# Patient Record
Sex: FEMALE | Race: WHITE
[De-identification: names, ages, dates, MRNs, and addresses within clinical notes are randomized per-mention and may not be internally consistent; named-entity substitution may affect disease eponyms.]

---

## 2017-03-17 ENCOUNTER — HOSPITAL ENCOUNTER (OUTPATIENT)
Dept: HOSPITAL 33 - ED | Age: 47
Setting detail: OBSERVATION
LOS: 1 days | Discharge: HOME | End: 2017-03-18
Attending: FAMILY MEDICINE | Admitting: FAMILY MEDICINE
Payer: MEDICARE

## 2017-03-17 DIAGNOSIS — E87.6: Primary | ICD-10-CM

## 2017-03-17 DIAGNOSIS — Z72.0: ICD-10-CM

## 2017-03-17 DIAGNOSIS — I15.2: ICD-10-CM

## 2017-03-17 DIAGNOSIS — F41.8: ICD-10-CM

## 2017-03-17 DIAGNOSIS — E26.89: ICD-10-CM

## 2017-03-17 LAB
ANION GAP SERPL CALC-SCNC: 14.8 MEQ/L (ref 5–15)
BASOPHILS NFR BLD AUTO: 0.3 % (ref 0–0.4)
BUN SERPL-MCNC: 10 MG/DL (ref 9–20)
CHLORIDE SERPL-SCNC: 112 MEQ/L (ref 98–107)
CO2 SERPL-SCNC: 22.4 MEQ/L (ref 21–32)
GLUCOSE SERPL-MCNC: 134 MG/DL (ref 70–110)
MCH RBC QN AUTO: 30 PG (ref 26–32)
NEUTROPHILS NFR BLD AUTO: 49.7 % (ref 36–66)
PLATELET # BLD AUTO: 298 K/MM3 (ref 150–450)
POTASSIUM SERPLBLD-SCNC: 2.1 MEQ/L (ref 3.5–5.1)
RBC # BLD AUTO: 4.9 M/MM3 (ref 4.1–5.4)
SODIUM SERPL-SCNC: 147 MEQ/L (ref 136–145)
WBC # BLD AUTO: 11.8 K/MM3 (ref 4–10.5)

## 2017-03-17 PROCEDURE — 83735 ASSAY OF MAGNESIUM: CPT

## 2017-03-17 PROCEDURE — 36000 PLACE NEEDLE IN VEIN: CPT

## 2017-03-17 PROCEDURE — 96367 TX/PROPH/DG ADDL SEQ IV INF: CPT

## 2017-03-17 PROCEDURE — 93268 ECG RECORD/REVIEW: CPT

## 2017-03-17 PROCEDURE — 36415 COLL VENOUS BLD VENIPUNCTURE: CPT

## 2017-03-17 PROCEDURE — 80048 BASIC METABOLIC PNL TOTAL CA: CPT

## 2017-03-17 PROCEDURE — 96360 HYDRATION IV INFUSION INIT: CPT

## 2017-03-17 PROCEDURE — 93005 ELECTROCARDIOGRAM TRACING: CPT

## 2017-03-17 PROCEDURE — 84132 ASSAY OF SERUM POTASSIUM: CPT

## 2017-03-17 PROCEDURE — 96365 THER/PROPH/DIAG IV INF INIT: CPT

## 2017-03-17 PROCEDURE — 85025 COMPLETE CBC W/AUTO DIFF WBC: CPT

## 2017-03-17 PROCEDURE — 99285 EMERGENCY DEPT VISIT HI MDM: CPT

## 2017-03-17 RX ADMIN — METOPROLOL SUCCINATE SCH MG: 25 TABLET, EXTENDED RELEASE ORAL at 21:54

## 2017-03-17 RX ADMIN — POTASSIUM CHLORIDE SCH MLS/HR: 14.9 INJECTION, SOLUTION INTRAVENOUS at 22:22

## 2017-03-17 RX ADMIN — POTASSIUM CHLORIDE SCH MLS/HR: 14.9 INJECTION, SOLUTION INTRAVENOUS at 17:38

## 2017-03-17 RX ADMIN — PREGABALIN SCH MG: 25 CAPSULE ORAL at 21:53

## 2017-03-17 RX ADMIN — AMILORIDE HYDROCHLORIDE SCH MG: 5 TABLET ORAL at 22:52

## 2017-03-17 NOTE — ERPHSYRPT
- History of Present Illness


Time Seen by Provider: 03/17/17 12:50


Source: patient


Exam Limitations: clinical condition


Patient Subjective Stated Complaint: "they [facility] told [facility] to come to [facility] because 

my potassium is 2.6"


Triage Nursing Assessment: aox3, breathign easy unlabored, skin pink warm dry, 

steady gait


Physician History: 





PATIENT WITH A HISTORY OF HYPERTENSION, REFERRED TO EMERGENCY FOR EVALUATION OF 

LOW POTASSIUM FROM CLINIC. PATIENT COMPLAINS OF LEFT NUMBNESS ON OCCASION, 

DENIES EMESIS OR DIARRHEA.


Timing/Duration: yesterday


Modifying Factors: Improves With: other (DENIES COMPLAINS OTHER THAN NUMBNESS)


Associated Symptoms: weakness


Allergies/Adverse Reactions: 








venom-honey bee [bee venom (honey bee)] Allergy (Severe, Verified 07/27/16 13:01

)


 Difficulty Breathing


Iodinated Contrast Media - Oral and [Iodinated Contrast Media - IV Dye] Allergy 

(Intermediate, Verified 07/27/16 10:36)


 Itching


cocoa butter Allergy (Verified 07/27/16 13:01)


 Hives


codeine [Codeine] Allergy (Verified 09/30/12 23:14)


 


iodine Allergy (Verified 09/30/12 23:14)


 


sumatriptan [From Imitrex] Allergy (Verified 09/30/12 23:14)


 


sumatriptan succinate [From Imitrex] Allergy (Verified 09/30/12 23:14)


 


hydrochlorothiazide Adverse Reaction (Verified 07/27/16 13:01)


 


tramadol Adverse Reaction (Verified 07/27/16 13:01)


 Vomiting





Home Medications: 








Cyclobenzaprine HCl 10 mg*** [Cyclobenzaprine 10 MG***] 10 mg PO HSPRN PRN 07/27 /16 [History]


Loratadine 10 mg*** [Claritin 10 mg***] 10 mg PO DAILY 07/27/16 [History]


Metoprolol Succinate 25 mg Xl* [Toprol-Xl 25MG Tablets***] 25 mg PO BID 07/27/ 16 [History]


Naproxen [Naprosyn] 500 mg PO BID PRN 07/27/16 [History]


Oxybutynin Chloride [Oxybutynin Chloride ER] 10 mg PO DAILY 07/27/16 [History]


Simvastatin 0.5 tab PO HS 07/27/16 [History]


Topiramate 100 mg*** [Topamax 100 MG***] 50 mg PO BID 07/27/16 [History]


Famotidine [Pepcid] 40 mg PO DAILY 03/17/17 [History]


Potassium Chloride [Potassium Chloride 20MEQ/15ML] 40 meq PO DAILY 03/17/17 [

History]


Pregabalin 25 mg*** [Lyrica 25 MG***] 25 mg PO BID 03/17/17 [History]





Hx Tetanus, Diphtheria Vaccination/Date Given: Yes


Hx Influenza Vaccination/Date Given: No


Hx Pneumococcal Vaccination/Date Given: No





- Review of Systems


Constitutional: Weakness, No Fever, No Chills


Eyes: No Symptoms


Ears, Nose, & Throat: No Symptoms


Respiratory: No Symptoms, No Cough, No Dyspnea


Cardiac: No Symptoms, No Chest Pain, No Edema, No Syncope


Abdominal/Gastrointestinal: No Symptoms, No Abdominal Pain, No Nausea, No 

Vomiting, No Diarrhea


Genitourinary Symptoms: No Symptoms, No Dysuria


Musculoskeletal: No Symptoms, No Back Pain, No Neck Pain


Skin: No Symptoms, No Rash


Neurological: Parasthesia (IN LEGS), No Dizziness, No Focal Weakness, No 

Sensory Changes


Psychological: No Symptoms


Endocrine: No Symptoms


Hematologic/Lymphatic: No Symptoms


All Other Systems: Reviewed and Negative





- Past Medical History


Pertinent Past Medical History: Yes


Neurological History: Migraines


ENT History: No Pertinent History


Cardiac History: Hypertension


Respiratory History: No Pertinent History


Endocrine Medical History: No Pertinent History


Musculoskeletal History: No Pertinent History


GI Medical History: No Pertinent History


 History: No Pertinent History


Psycho-Social History: Anxiety, Depression


Female Reproductive Disorders: No Pertinent History





- Past Surgical History


Past Surgical History: Yes


Neuro Surgical History: No Pertinent History


Cardiac: No Pertinent History


Respiratory: No Pertinent History


Gastrointestinal: Appendectomy


Genitourinary: No Pertinent History


Musculoskeletal: No Pertinent History


Female Surgical History: Hysterectomy





- Social History


Smoking Status: Current every day smoker


How long have you smoked: 30 YEARS


Exposure to second hand smoke: Yes


Drug Use: none


Patient Lives Alone: No





- Female History


Hx Pregnant Now: No





- Nursing Vital Signs


Nursing Vital Signs: 


 Initial Vital Signs











Temperature                    98.0 F


 


Temperature Source             Oral


 


Pulse Rate                     73


 


Respiratory Rate               22


 


Blood Pressure [Right Arm]     156/102


 


Pain Intensity                 4

















- Physical Exam


General Appearance: no apparent distress, alert


Eye Exam: PERRL/EOMI, eyes nml inspection


Ears, Nose, Throat Exam: normal ENT inspection, TMs normal, pharynx normal, 

moist mucous membranes


Neck Exam: normal inspection, non-tender, supple, full range of motion


Respiratory Exam: normal breath sounds, lungs clear, No respiratory distress


Cardiovascular Exam: regular rate/rhythm, normal heart sounds, normal 

peripheral pulses


Gastrointestinal/Abdomen Exam: soft, normal bowel sounds, No tenderness, No mass


Back Exam: normal inspection, normal range of motion, No CVA tenderness, No 

vertebral tenderness


Extremity Exam: normal inspection, normal range of motion, pelvis stable


Neurologic Exam: alert, oriented x 3, cooperative, normal mood/affect, nml 

cerebellar function, nml station & gait, sensation nml, No motor deficits


Skin Exam: normal color, warm, dry, No rash


Lymphatic Exam: No adenopathy


**SpO2 Interpretation**: normal


SpO2: 97


Oxygen Delivery: Room Air





- Course


EKG Interpreted by Me: RATE, Sinus Rhythm, NORMAL AXIS, Other (RATE 73 ANTERIOR 

LATERAL FLAT T-WAVES)


Ordered Tests: 


 Active Orders 24 hr











 Category Date Time Status


 


 EKG-ER Only STAT Care  03/17/17 14:07 Active


 


 BMP Stat Lab  03/17/17 13:24 Completed


 


 CBC W DIFF Stat Lab  03/17/17 13:24 Completed


 


 MAGNESIUM Stat Lab  03/17/17 13:24 Completed


 


 Transfer Order Routine Transfer  03/17/17 14:31 Ordered








Medication Summary











Generic Name Dose Route Start Last Admin





  Trade Name Freq  PRN Reason Stop Dose Admin


 


Sodium Chloride  1,000 mls @ 50 mls/hr  03/17/17 13:15  03/17/17 13:14





  Sodium Chloride 0.9% 1000 Ml  IV  04/16/17 13:14  50 mls/hr





  .Q20H SHAVON   Administration


 


Potassium Chloride  100 mls @ 50 mls/hr  03/17/17 13:55  03/17/17 14:10





  Potassium Chloride 20 Meq In Water 100ml  IV  03/17/17 15:54  50 mls/hr





  STAT ONE   Administration


 


Potassium Chloride  40 meq  03/18/17 14:17  03/17/17 14:25





  Potassium Chl 40 Meq/30 Ml Oral Solution***  PO  03/18/17 14:18  40 meq





  STAT ONE   Administration














Discontinued Medications














Generic Name Dose Route Start Last Admin





  Trade Name Freq  PRN Reason Stop Dose Admin


 


Sodium Chloride  Confirm  03/17/17 13:14  





  Sodium Chloride 0.9% 1000 Ml  Administered  03/17/17 13:15  





  Dose   





  1,000 mls @ ud   





  .ROUTE   





  .STK-MED ONE   


 


Potassium Chloride  Confirm  03/17/17 14:07  





  Potassium Chloride 20 Meq In Water 100ml  Administered  03/17/17 14:08  





  Dose   





  100 mls @ ud   





  IV   





  .STK-MED ONE   


 


Potassium Chloride  40 meq  03/18/17 10:00  





  Potassium Chl 40 Meq/30 Ml Oral Solution***  PO  04/17/17 09:59  





  DAILY SHAVON   


 


Potassium Chloride  Confirm  03/17/17 14:07  





  Potassium Chl 40 Meq/30 Ml Oral Solution***  Administered  03/17/17 14:08  





  Dose   





  40 meq   





  .ROUTE   





  .STK-MED ONE   











Lab/Rad Data: 


 Laboratory Result Diagrams





 03/17/17 13:24 





 03/17/17 13:24 





 Laboratory Results











  03/17/17 03/17/17 03/17/17 Range/Units





  13:24 13:24 13:24 


 


WBC    11.8 H  (4.0-10.5)  K/mm3


 


RBC    4.90  (4.1-5.4)  M/mm3


 


Hgb    14.7  (12.0-16.0)  gm/dl


 


Hct    43.5  (35-47)  %


 


MCV    88.8  ()  fl


 


MCH    30.0  (26-32)  pg


 


MCHC    33.8  (32-36)  g/dl


 


RDW    14.5 H  (11.5-14.0)  %


 


Plt Count    298  (150-450)  K/mm3


 


MPV    10.3 H  (6-9.5)  fl


 


Gran %    49.7  (36.0-66.0)  %


 


Lymphocytes %    40.5  (24.0-44.0)  %


 


Monocytes %    6.7  (0.0-12.0)  %


 


Eosinophils %    2.8  (0.00-5.0)  %


 


Basophils %    0.3  (0.0-0.4)  %


 


Basophils #    0.03  (0-0.4)  


 


Sodium   147 H   (136-145)  mEq/L


 


Potassium   2.1 L*   (3.5-5.1)  mEq/L


 


Chloride   112 H   ()  mEq/L


 


Carbon Dioxide   22.4   (21-32)  mEq/L


 


Anion Gap   14.8   (5-15)  MEQ/L


 


BUN   10   (9-20)  mg/dL


 


Creatinine   0.86   (0.55-1.30)  mg/dl


 


Estimated GFR   > 60   ML/MIN


 


Glucose   134 H   ()  MG/DL


 


Calcium   8.5   (8.5-10.1)  mg/dL


 


Magnesium  2.0    (1.8-2.4)  mg/dL














- Progress


Discussed with : Alberto


Will see patient in: hospital (full admit) (DISCUSSED WITH DR RAMIREZ AT 1430 FOR 

ADMISSION)





- Departure


Time of Disposition: 14:35


Departure Disposition: In-patient Admission


Clinical Impression: 


 HYPOKALEMIA





Condition: Stable


Critical Care Time: No


Referrals: 


SARA HOWE MD [Primary Care Provider] -

## 2017-03-17 NOTE — PCM.HP
History of Present Illness





- Chief Complaint


Chief Complaint: hypokalemia


History of Present Illness: 


 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 

profound hypokalemia in the past. she was sent to ER due to low potassium from 

[facility], she denies chest pain, palpitations or paresthesias. She has not 

had any vomiting or diarrhea etc.








- Review of Systems


Constitutional: No Symptoms


Eyes: No Symptoms


Respiratory: No Cough, No Short Of Breath


Cardiac: No Chest Pain, No Edema, No Syncope


Abdominal/Gastrointestinal: No Abdominal Pain, No Nausea, No Vomiting, No 

Diarrhea


Neurological: No Dizziness, No Focal Weakness, No Sensory Changes





Medications & Allergies


Home Medications: 


 Home Medication List





Cyclobenzaprine HCl 10 mg*** [Cyclobenzaprine 10 MG***] 10 mg PO HSPRN PRN 07/27 /16 [History Confirmed 03/17/17]


Loratadine 10 mg*** [Claritin 10 mg***] 10 mg PO DAILY 07/27/16 [History 

Confirmed 03/17/17]


Metoprolol Succinate 25 mg Xl* [Toprol-Xl 25MG Tablets***] 25 mg PO BID 07/27/ 16 [History Confirmed 03/17/17]


Naproxen [Naprosyn] 500 mg PO BID PRN 07/27/16 [History Confirmed 03/17/17]


Oxybutynin Chloride [Oxybutynin Chloride ER] 10 mg PO DAILY 07/27/16 [History 

Confirmed 03/17/17]


Simvastatin 0.5 tab PO HS 07/27/16 [History Confirmed 03/17/17]


Topiramate 100 mg*** [Topamax 100 MG***] 50 mg PO DAILY 07/27/16 [History 

Confirmed 03/17/17]


Amiloride HCl [Midamor] 5 mg PO BID #60 tablet 07/29/16 [Rx Confirmed 03/17/17]


Famotidine [Pepcid] 40 mg PO DAILY 03/17/17 [History Confirmed 03/17/17]


Potassium Chloride [Potassium Chloride 20MEQ/15ML] 40 meq PO DAILY 03/17/17 [

History Confirmed 03/17/17]


Pregabalin 25 mg*** [Lyrica 25 MG***] 25 mg PO BID 03/17/17 [History Confirmed 

03/17/17]


Topiramate 100 mg*** [Topamax 100 MG***] 100 mg PO HS 03/17/17 [History 

Confirmed 03/17/17]








Allergies/Adverse Reactions: 


 Allergies











Allergy/AdvReac Type Severity Reaction Status Date / Time


 


venom-honey bee Allergy Severe Difficulty Verified 07/27/16 13:01





[bee venom (honey bee)]   Breathing  


 


Iodinated Contrast Media - Allergy Intermediate Itching Verified 07/27/16 10:36





Oral and     





[Iodinated Contrast Media -     





IV Dye]     


 


cocoa butter Allergy  Hives Verified 07/27/16 13:01


 


iodine Allergy  Difficulty Verified 03/17/17 15:34





   Breathing  


 


sumatriptan [From Imitrex] Allergy  Difficulty Verified 03/17/17 15:34





   Breathing  


 


sumatriptan succinate Allergy  Difficulty Verified 03/17/17 15:34





[From Imitrex]   Breathing  


 


codeine [Codeine] AdvReac  Vomiting Verified 03/17/17 15:34


 


hydrochlorothiazide AdvReac  Blisters Verified 03/17/17 15:34


 


tramadol AdvReac  Vomiting Verified 07/27/16 13:01














- Past Medical History


Past Medical History: Yes


Neurological History: Migraines


ENT History: No Pertinent History


Cardiac History: Hypertension


Respiratory History: No Pertinent History


Endocrine Medical History: No Pertinent History


Musculoskelatal History: No Pertinent History


GI Medical History: No Pertinent History


 History: No Pertinent History


Pyscho-Social History: Anxiety, Depression


Reproductive Disorders: No Pertinent History





- Female History


Are you pregnant now?: No





- Past Surgical History


Past Surgical History: Yes


Neuro Surgical History: No Pertinent History


Cardiac History: No Pertinent History


Respiratory Surgery: No Pertinent History


GI Surgical History: Appendectomy


Genitourinary Surgical Hx: No Pertinent History


Musculskeletal Surgical Hx: No Pertinent History


Female Surgical History: Hysterectomy





- Social History


Smoking Status: Current every day smoker


How long have you smoked: 30 years


Exposure to second hand smoke: Yes


Alcohol: None, Rarely


Drug Use: none





- Physical Exam


Vital Signs: 


 Vital Signs - 24 hr











  Temp Pulse Resp BP Pulse Ox


 


 03/17/17 15:35  97.7 F  71  19  183/103  98


 


 03/17/17 15:01   72  18  179/115  98


 


 03/17/17 14:39      97


 


 03/17/17 13:53   73  22  156/102  97


 


 03/17/17 13:24   71  22  164/88  97


 


 03/17/17 12:37  98.0 F  82  16  170/104  98











General Appearance: no apparent distress, alert


Neurologic Exam: alert, oriented x 3, cooperative, normal mood/affect, nml 

cerebellar function, nml station & gait, sensation nml, No motor deficits


Eye Exam: PERRL/EOMI, eyes nml inspection


Respiratory Exam: normal breath sounds, lungs clear, No respiratory distress


Cardiovascular Exam: regular rate/rhythm, normal heart sounds, normal 

peripheral pulses


Gastrointestinal/Abdomen Exam: soft, normal bowel sounds, No tenderness, No mass


Extremity Exam: normal inspection


Skin Exam: normal color, warm, dry, No rash





Assessment/Plan


(1) Hypokalemia


Current Visit: Yes   Status: Acute   


Assessment & Plan: 


will replace, continue amiloride


Code(s): E87.6 - HYPOKALEMIA   





(2) Liddle's syndrome


Current Visit: Yes   Status: Acute   Code(s): I15.2 - HYPERTENSION SECONDARY TO 

ENDOCRINE DISORDERS; E26.89 - OTHER HYPERALDOSTERONISM

## 2017-03-18 VITALS — SYSTOLIC BLOOD PRESSURE: 144 MMHG | OXYGEN SATURATION: 73 % | DIASTOLIC BLOOD PRESSURE: 75 MMHG | HEART RATE: 76 BPM

## 2017-03-18 LAB
ANION GAP SERPL CALC-SCNC: 12.9 MEQ/L (ref 5–15)
BUN SERPL-MCNC: 7 MG/DL (ref 9–20)
CHLORIDE SERPL-SCNC: 112 MEQ/L (ref 98–107)
CO2 SERPL-SCNC: 24.2 MEQ/L (ref 21–32)
GLUCOSE SERPL-MCNC: 87 MG/DL (ref 70–110)
POTASSIUM SERPLBLD-SCNC: 3.6 MEQ/L (ref 3.5–5.1)
SODIUM SERPL-SCNC: 146 MEQ/L (ref 136–145)

## 2017-03-18 RX ADMIN — PREGABALIN SCH MG: 25 CAPSULE ORAL at 08:26

## 2017-03-18 RX ADMIN — POTASSIUM CHLORIDE SCH MLS/HR: 14.9 INJECTION, SOLUTION INTRAVENOUS at 08:22

## 2017-03-18 RX ADMIN — METOPROLOL SUCCINATE SCH MG: 25 TABLET, EXTENDED RELEASE ORAL at 08:26

## 2017-03-18 RX ADMIN — POTASSIUM CHLORIDE SCH MLS/HR: 14.9 INJECTION, SOLUTION INTRAVENOUS at 04:44

## 2017-03-18 RX ADMIN — AMILORIDE HYDROCHLORIDE SCH MG: 5 TABLET ORAL at 08:26

## 2017-03-18 NOTE — PCM.DS
Discharge Summary


Date of Admission: 


03/17/17 15:20





Admitting Physician: 


BUD RAMIREZ





Primary Care Provider: 


SARA HOWE








Allergies


Allergies





venom-honey bee [bee venom (honey bee)] Allergy (Severe, Verified 07/27/16 13:01

)


 Difficulty Breathing


Iodinated Contrast Media - Oral and [Iodinated Contrast Media - IV Dye] Allergy 

(Intermediate, Verified 07/27/16 10:36)


 Itching


cocoa butter Allergy (Verified 07/27/16 13:01)


 Hives


iodine Allergy (Verified 03/17/17 15:34)


 Difficulty Breathing


sumatriptan [From Imitrex] Allergy (Verified 03/17/17 15:34)


 Difficulty Breathing


sumatriptan succinate [From Imitrex] Allergy (Verified 03/17/17 15:34)


 Difficulty Breathing


codeine [Codeine] Adverse Reaction (Verified 03/17/17 15:34)


 Vomiting


hydrochlorothiazide Adverse Reaction (Verified 03/17/17 15:34)


 Blisters


tramadol Adverse Reaction (Verified 07/27/16 13:01)


 Vomiting











Hospital Summary





- Hospital Course


Hospital Course: 





[facility] with hyp[facility]alemia, hx of Liddle syndrome (

pseudohypoaldosteronism) had her potassium decreased from 40meq to 20meq daily 

in Jan by her endo. overall doing well and feels fine. potassium has been 

replaced.





- Vitals & Intake/Output


Vital Signs: 





 Vital Signs











Temperature  97.6 F   03/18/17 07:33


 


Pulse Rate  86   03/18/17 07:33


 


Respiratory Rate  18   03/18/17 08:00


 


Blood Pressure  150/91   03/18/17 07:33


 


O2 Sat by Pulse Oximetry  96   03/18/17 07:33











Intake & Output: 





 Intake & Output











 03/15/17 03/16/17 03/17/17 03/18/17





 11:59 11:59 11:59 11:59


 


Intake Total    1557


 


Balance    1557


 


Weight    92.986 kg














- Lab


Result Diagrams: 


 03/17/17 13:24





 03/18/17 04:00


Lab Results-Last 24 Hrs: 





 Lab Results-Last 24 Hours











  03/18/17 Range/Units





  04:00 


 


Potassium  2.8 L*  (3.5-5.1)  mEq/L














- Procedures and Test


Procedures and Tests throughout Hospitalization: 





 Therapy Orders & Screens





03/17/17 15:54


Smoking Cessation Education ONCE 


   Comment: 


   Diagnosis: hypokalemia


   Smoking Status: Current every day smoker


   How long have you smoked: 30 years


   Have you smoked in the past 12 months: Yes


   Approximately how many cigarettes per day: 1.5 packs


   Do you dip or chew tobacco: No














Discharge Exam


General Appearance: no apparent distress, alert


Respiratory Exam: normal breath sounds, lungs clear, No respiratory distress


Cardiovascular Exam: regular rate/rhythm, normal heart sounds


Gastrointestinal/Abdomen Exam: soft, No tenderness, No mass


Extremity Exam: normal inspection, normal range of motion





Final Diagnosis/Problem List





- Final Discharge Diagnosis/Problem


(1) Hypokalemia


Current Visit: Yes   Status: Acute   


Assessment & Plan: 


replaced, want to be around 3.5 then will discharge and increase back to 40meq 

daily








(2) Liddle's syndrome


Current Visit: Yes   Status: Acute   


Assessment & Plan: 


f/u with endo








- Discharge


Disposition: Home, Self-Care


Condition: Stable


Prescriptions: 


No Action


   Oxybutynin Chloride [Oxybutynin Chloride ER] 10 mg PO DAILY


   Simvastatin 0.5 tab PO HS


   Loratadine 10 mg*** [Claritin 10 mg***] 10 mg PO DAILY


   Metoprolol Succinate 25 mg Xl* [Toprol-Xl 25MG Tablets***] 25 mg PO BID


   Naproxen [Naprosyn] 500 mg PO BID PRN


     PRN Reason: Pain


   Topiramate 100 mg*** [Topamax 100 MG***] 50 mg PO DAILY


   Cyclobenzaprine HCl 10 mg*** [Cyclobenzaprine 10 MG***] 10 mg PO HSPRN PRN


     PRN Reason: muscle pain


   Amiloride HCl [Midamor] 5 mg PO BID #60 tablet


   Potassium Chloride [Potassium Chloride 20MEQ/15ML] 40 meq PO DAILY


   Pregabalin 25 mg*** [Lyrica 25 MG***] 25 mg PO BID


   Famotidine [Pepcid] 40 mg PO DAILY


   Topiramate 100 mg*** [Topamax 100 MG***] 100 mg PO HS


Follow up with: 


SARA HOWE MD [Primary Care Provider] -

## 2017-10-09 ENCOUNTER — HOSPITAL ENCOUNTER (EMERGENCY)
Dept: HOSPITAL 33 - ED | Age: 47
Discharge: HOME | End: 2017-10-09
Payer: OTHER GOVERNMENT

## 2017-10-09 VITALS — HEART RATE: 73 BPM | SYSTOLIC BLOOD PRESSURE: 148 MMHG | DIASTOLIC BLOOD PRESSURE: 92 MMHG

## 2017-10-09 VITALS — OXYGEN SATURATION: 95 %

## 2017-10-09 DIAGNOSIS — Z79.899: ICD-10-CM

## 2017-10-09 DIAGNOSIS — T78.3XXA: ICD-10-CM

## 2017-10-09 DIAGNOSIS — T78.40XA: Primary | ICD-10-CM

## 2017-10-09 DIAGNOSIS — E78.00: ICD-10-CM

## 2017-10-09 DIAGNOSIS — I10: ICD-10-CM

## 2017-10-09 PROCEDURE — 96372 THER/PROPH/DIAG INJ SC/IM: CPT

## 2017-10-09 PROCEDURE — 99284 EMERGENCY DEPT VISIT MOD MDM: CPT

## 2017-10-09 NOTE — ERPHSYRPT
- History of Present Illness


Time Seen by Provider: 10/09/17 14:13


Source: patient


Exam Limitations: no limitations


Patient Subjective Stated Complaint: PT HERE FOR SWELLING TO LIPS AND EYES 

AFTER TAKING 2 NEW MEDS YESTERDAY,NO DIFFICULTY BREATHING OR SWALLOWING


Triage Nursing Assessment: PT WALKED IN ALERT, RESP EASY, SKIN W.D,PINK, IN NO 

DISTRESS, NO SWELLING TO LIPS. EYE PUFFY.


Physician History: 





The patient is a 47-year-old female with her  complaining of eyelid 

swelling and lip tingling just before arrival.  She's had angioedema in the 

past from an unknown allergen.  She took one Benadryl.  Eyelid swelling has 

resolved.  Her past medical history is significant for angioedema, migraines, 

hypertension, high cholesterol, and GERD.


Timing/Duration: today, resolved prior to arrival


Quality: other (swelling)


Location: face


Possible Causes: no cause identified


Modifying Factors: Improves With: antihistamine


Associated Symptoms: edema, No hives, No rash


Allergies/Adverse Reactions: 








venom-honey bee [bee venom (honey bee)] Allergy (Severe, Verified 16 13:01

)


 Difficulty Breathing


Iodinated Contrast- Oral and IV Dye [Iodinated Contrast Media - IV Dye] Allergy 

(Intermediate, Verified 16 10:36)


 Itching


cocoa butter Allergy (Verified 16 13:01)


 Hives


iodine Allergy (Verified 17 15:34)


 Difficulty Breathing


sumatriptan [From Imitrex] Allergy (Verified 17 15:34)


 Difficulty Breathing


sumatriptan succinate [From Imitrex] Allergy (Verified 17 15:34)


 Difficulty Breathing


codeine [Codeine] Adverse Reaction (Verified 17 15:34)


 Vomiting


hydrochlorothiazide Adverse Reaction (Verified 17 15:34)


 Blisters


tramadol Adverse Reaction (Verified 16 13:01)


 Vomiting





Home Medications: 








Cyclobenzaprine HCl 10 mg*** [Cyclobenzaprine 10 MG***] 10 mg PO HSPRN PRN  [History]


Loratadine 10 mg*** [Claritin 10 mg***] 10 mg PO DAILY 16 [History]


Metoprolol Succinate 25 mg Xl* [Toprol-Xl 25MG Tablets***] 25 mg PO BID  [History]


Naproxen [Naprosyn] 500 mg PO BID PRN 16 [History]


Oxybutynin Chloride [Oxybutynin Chloride ER] 10 mg PO DAILY 16 [History]


Simvastatin 0.5 tab PO HS 16 [History]


Topiramate 100 mg*** [Topamax 100 MG***] 50 mg PO DAILY 16 [History]


Famotidine [Pepcid] 40 mg PO DAILY 17 [History]


Potassium Chloride [Potassium Chloride 20MEQ/15ML] 40 meq PO DAILY 17 [

History]


Pregabalin 25 mg*** [Lyrica 25 MG***] 25 mg PO BID 17 [History]


Topiramate 100 mg*** [Topamax 100 MG***] 100 mg PO HS 17 [History]





Hx Tetanus, Diphtheria Vaccination/Date Given: Yes


Hx Influenza Vaccination/Date Given: No


Hx Pneumococcal Vaccination/Date Given: No


Immunizations Up to Date: Yes





- Review of Systems


Constitutional: No Fever, No Chills


Eyes: No Symptoms


Ears, Nose, & Throat: No Symptoms


Respiratory: No Cough, No Dyspnea


Cardiac: No Chest Pain, No Edema, No Syncope


Abdominal/Gastrointestinal: No Abdominal Pain, No Nausea, No Vomiting, No 

Diarrhea


Genitourinary Symptoms: No Symptoms


Musculoskeletal: No Back Pain, No Neck Pain


Skin: Other (swelling)


Neurological: No Dizziness, No Focal Weakness, No Sensory Changes


Psychological: No Symptoms


Endocrine: No Symptoms


Hematologic/Lymphatic: No Symptoms


Immunological/Allergic: No Symptoms


All Other Systems: Reviewed and Negative





- Past Medical History


Pertinent Past Medical History: Yes


Neurological History: Migraines


ENT History: No Pertinent History


Cardiac History: Hypertension


Respiratory History: No Pertinent History


Endocrine Medical History: No Pertinent History


Musculoskeletal History: No Pertinent History


GI Medical History: No Pertinent History


 History: No Pertinent History


Psycho-Social History: Anxiety, Depression


Female Reproductive Disorders: No Pertinent History





- Past Surgical History


Past Surgical History: Yes


Neuro Surgical History: No Pertinent History


Cardiac: No Pertinent History


Respiratory: No Pertinent History


Gastrointestinal: Appendectomy


Genitourinary: No Pertinent History


Musculoskeletal: No Pertinent History


Female Surgical History: Hysterectomy,  Section





- Social History


Smoking Status: Current every day smoker


How long have you smoked: 30 years


Exposure to second hand smoke: Yes


Drug Use: none


Patient Lives Alone: No





- Female History


Hx Last Menstrual Period: HYSTER


Hx Pregnant Now: No





- Nursing Vital Signs


Nursing Vital Signs: 


 Initial Vital Signs











Temperature  98.5 F   10/09/17 13:29


 


Pulse Rate  80   10/09/17 13:29


 


Respiratory Rate  18   10/09/17 13:29


 


Blood Pressure  142/98   10/09/17 13:29


 


O2 Sat by Pulse Oximetry  95   10/09/17 13:29








 Pain Scale











Pain Intensity                 0

















- Physical Exam


General Appearance: no apparent distress, alert


Eye Exam: PERRL/EOMI, eyes nml inspection


Ears, Nose, Throat Exam: normal ENT inspection, pharynx normal, moist mucous 

membranes


Neck Exam: normal inspection, non-tender, supple, full range of motion


Respiratory Exam: normal breath sounds, lungs clear, No respiratory distress


Cardiovascular Exam: regular rate/rhythm, normal heart sounds


Gastrointestinal/Abdomen Exam: soft, mass, No tenderness


Pelvic Exam: not done


Rectal Exam: not done


Back Exam: normal inspection, normal range of motion, No CVA tenderness, No 

vertebral tenderness


Extremity Exam: normal inspection, normal range of motion


Neurologic Exam: alert, oriented x 3, cooperative, normal mood/affect, 

sensation nml, No motor deficits


Skin Exam: normal color, warm, dry


**SpO2 Interpretation**: normal


SpO2: 95


Oxygen Delivery: Room Air





- Progress


Progress: improved (resolved)





- Departure


Time of Disposition: 14:32


Departure Disposition: Home


Clinical Impression: 


 Allergic reaction





Condition: Stable


Critical Care Time: No


Referrals: 


BUD RAMIREZ MD [Primary Care Provider] - 


Additional Instructions: 


You had an allergic reaction.  You were given Benadryl 50 mg IM in the ER.  

Take prednisone 60 mg daily for 5 days.  If needed, take Benadryl 25-50 mg 

every 2-4 hours.  Follow-up as needed.


Prescriptions: 


Prednisone 10 mg*** [Deltasone 10 mg***] 60 mg PO DAILY #30 tablet

## 2019-04-16 ENCOUNTER — HOSPITAL ENCOUNTER (OUTPATIENT)
Dept: HOSPITAL 33 - SDC | Age: 49
Discharge: HOME | End: 2019-04-16
Attending: OPHTHALMOLOGY
Payer: COMMERCIAL

## 2019-04-16 VITALS — SYSTOLIC BLOOD PRESSURE: 135 MMHG | HEART RATE: 85 BPM | DIASTOLIC BLOOD PRESSURE: 83 MMHG | OXYGEN SATURATION: 98 %

## 2019-04-16 DIAGNOSIS — H25.811: Primary | ICD-10-CM

## 2019-04-16 DIAGNOSIS — G47.30: ICD-10-CM

## 2019-04-16 DIAGNOSIS — J44.9: ICD-10-CM

## 2019-04-16 DIAGNOSIS — Z79.899: ICD-10-CM

## 2019-04-16 RX ADMIN — TETRACAINE HYDROCHLORIDE ONE ML: 5 SOLUTION OPHTHALMIC at 11:17

## 2019-04-16 RX ADMIN — TETRACAINE HYDROCHLORIDE ONE ML: 5 SOLUTION OPHTHALMIC at 11:42

## 2019-04-16 NOTE — OP
DATE/TIME OF OPERATION:  04/16/2019  1147

TIME DICTATED:  1235 

PREOPERATIVE DIAGNOSIS:    Senile cataract of right eye.  

POSTOPERATIVE DIAGNOSIS:  Senile cataract of right eye.  

SURGEON:  Renetta Ha MD

ASSISTANT:  None.

 OPERATION:  Cataract extraction of right eye with an intraocular lens implant. 

               STANDARD __X__   COMPLEX ______

ANESTHESIA:  MAC.

___X___  Monitored anesthesia care in combination with topical and intra-cameral 
anesthesia (because of the established specific risk of reflux, arrhythmias, or an anxiety 
attack associated with ocular manipulation as well as difficulty of the ophthalmologist to 
manage such potentially catastrophic events while simultaneously attempting to complete 
the surgical procedure, it was deemed necessary for the patient's safety to have an 
anesthesiologist or a nurse anesthetist present during the procedure whenever possible. 
The anesthesiologist or the nurse anesthetist was utilized to monitor and regulate the 
intravenous sedation of the patient, so the patient was cooperative, relaxed, and 
comfortable).

______ Topical anesthesia using Tetracaine eye drops together with intra cameral 
anesthesia using Lidocaine 1% MPF. The nurse was utilized to monitor the patient.



ANESTHESIA PROVIDER:  Orlando Brito CRNA.

COMPLICATIONS:  None.

BLOOD LOSS:  None.

INDICATIONS:  The patient is undergoing cataract surgery in the hopes of eliminating the 
visual complaints and difficulty. 

PROCEDURE:  After arriving at the facility's outpatient surgery area, an IV was started; 
the patient was given 5 mg of p.o. Versed. (If an anesthesia provider was not monitoring 
the patient) The patient was then given topical anesthetic Tetracaine eye drops. A cotton 
pellet was soaked into a solution of a combination of Zymaxid 0.5%, Guido-Synephrine 2.5% 
and Ocufen (other drops might have been substituted referenced in the patient's record). 
The pellet was inserted by the RN into the lower conjunctival cul-de-sac with a sterile 
forceps and left for 20 minutes. The pellet was then removed by the RN with a sterile 
forceps before taking the patient to the operating room. The preoperative area nurse 
identified the patient and marked the correct eye to be operated on.

 I identified the correct eye to be operated on and marked it appropriately in the 
outpatient surgery area.

The patient was then taken into the operating room. Tetracaine eye drops were installed 
again in the correct eye. The eyelids and the lashes and the lid margins were scrubbed 
with Betadine solution. One drop of the diluted Betadine solution was placed in the 
conjunctival cul-de-sac for 45 seconds and then was irrigated. A drop of Tetracaine Gel 
was placed in the conjunctival cul-de-sac. The patient's forehead was taped to secure it 
during the procedure. The patient was monitored.  

The patient was then draped in the usual way for this procedure. An eye speculum was used 
to separate the eyelids. The eye was then fixated and a temporal 2.5 mm incision was made 
in the clear cornea temporally at the limbus. Through the incision, 0.25 cc of 1% 
non-preserved lidocaine was injected into the anterior chamber for intracameral 
anesthesia. The anterior chamber was then filled with viscoelastic. 

_____ The pupil was small. I felt that it would be safer to mechanically dilate the pupil. 
 A Malyugin ring was used at this point which dilated the pupil. That was removed at the 
end of the procedure prior to aspiration of the viscoelastic from the anterior chamber and 
posterior to the intraocular lens implant.

_____ The cataract had a great amount of cortical changes. That rendered seeing the 
anterior capsule difficult for a safe performance of an anterior capsulotomy. I injected 
an air bubble into the anterior chamber. I then injected 1 ML of vision blue solution into 
the anterior chamber. The vision blue solution was irrigated from the anterior chamber 
after 30 seconds. The anterior capsule was stained which facilitated performing the 
anterior capsulotomy safely. 

 After that was completed, a cystotome was introduced into the anterior chamber and a 
round anterior capsulotomy was performed. The capsule was removed by a forceps.

Hydrodissection was next carried utilizing a 25-gauge cannula and balanced salt solution 
to delineate the cortical material from the capsule and the nucleus from the cortical 
material. The nucleus was rotated freely into the capsular bag with no difficulty.

The phaco tip of the [employer] Phaco[employer]ulsifier was introduced into the anterior 
chamber and two grooves were made into the nucleus 90 degrees apart. Using two spatulas 
resulted into the nucleus being fractured into four quadrants. The phaco tip was then used 
to remove each quadrant of the nucleus.

Viscoelastic was used during this process to protect the corneal endothelium. 

Once the entire nucleus was removed, the phaco tip then was removed and the irrigation tip 
was introduced into the eye and the cortex was removed. The posterior capsule was 
polished. 

______ It was noticed that there was a tear into the posterior capsule with few vitreous 
strands into the pupil plan. An anterior vitrectomy was performed.

A 22.50 diopter, [id number], posterior chamber lens implant, was inspected and found to be 
grossly normal. The implant was inserted into the implant injector cartridge; Viscoelastic 
again was introduced into the anterior chamber, which filled the capsular bag.  The 
implant injector's cartridge tip was placed at the limbal wound and the posterior chamber 
implant was released into the capsular bag and rotated appropriately. The implant was 
found to be into the capsular bag and it was centered.

__X__  0.2 ml of Tri-Moxi was introduced via 27 gauge cannula into the vitreous cavity 
through the ciliary processes.  

Viscoelastic was aspirated from the anterior chamber and posterior to the intraocular lens 
implant from the capsular bag using the irrigating tip.  

The anterior chamber was irrigated and filled with 5 cc antibiotic solution (500 cc of BSS 
plus 2 ml of Fortaz 100 mg/ml) ( if patient was not allergic to the medication). The lips 
of the corneal incision were hydrated using BSS solution. The anterior chamber was checked 
and found to be water tight. 

______ One drop each of antibiotic, steroid and NSAID drops (refer to chart for drops 
used) were placed in the conjunctival cul-de-sac of the operated eye.

Patient tolerated the procedure quite well and left the operating room in satisfactory 
condition.  

DISCHARGE SUMMARY:  The patient was released in stable condition. The patient and those 
with the patient were given an instruction sheet as of how to care for the eye after 
surgery as well as counseling on any abnormal laboratory studies by the postoperative RN.

The patient was also given an appointment card for follow-up in the office and is to call 
immediately for any difficulties including but not limited to pain in the eye, decreased 
vision, discharge from the eye, headache and or fever. 

DISCHARGE DIAGNOSIS: Pseudophakia of right eye.

## 2019-11-30 ENCOUNTER — HOSPITAL ENCOUNTER (EMERGENCY)
Dept: HOSPITAL 33 - ED | Age: 49
Discharge: HOME | End: 2019-11-30
Payer: COMMERCIAL

## 2019-11-30 VITALS — HEART RATE: 79 BPM | DIASTOLIC BLOOD PRESSURE: 77 MMHG | SYSTOLIC BLOOD PRESSURE: 119 MMHG

## 2019-11-30 VITALS — OXYGEN SATURATION: 97 %

## 2019-11-30 DIAGNOSIS — R42: Primary | ICD-10-CM

## 2019-11-30 DIAGNOSIS — Z79.899: ICD-10-CM

## 2019-11-30 DIAGNOSIS — R11.2: ICD-10-CM

## 2019-11-30 LAB
ALBUMIN SERPL-MCNC: 4.1 G/DL (ref 3.5–5)
ALP SERPL-CCNC: 69 U/L (ref 38–126)
ALT SERPL-CCNC: 25 U/L (ref 0–35)
AMPHETAMINES UR QL: NEGATIVE
ANION GAP SERPL CALC-SCNC: 12 MEQ/L (ref 5–15)
AST SERPL QL: 22 U/L (ref 14–36)
BARBITURATES UR QL: NEGATIVE
BASOPHILS # BLD AUTO: 0.04 10*3/UL (ref 0–0.4)
BASOPHILS NFR BLD AUTO: 0.4 % (ref 0–0.4)
BENZODIAZ UR QL SCN: NEGATIVE
BILIRUB BLD-MCNC: 0.4 MG/DL (ref 0.2–1.3)
BUN SERPL-MCNC: 12 MG/DL (ref 7–17)
CALCIUM SPEC-MCNC: 9.5 MG/DL (ref 8.4–10.2)
CHLORIDE SERPL-SCNC: 108 MMOL/L (ref 98–107)
CO2 SERPL-SCNC: 27 MMOL/L (ref 22–30)
COCAINE UR QL SCN: NEGATIVE
CREAT SERPL-MCNC: 0.6 MG/DL (ref 0.52–1.04)
EOSINOPHIL # BLD AUTO: 0.13 10*3/UL (ref 0–0.5)
GLUCOSE SERPL-MCNC: 129 MG/DL (ref 74–106)
GLUCOSE UR-MCNC: NEGATIVE MG/DL
HCT VFR BLD AUTO: 43.8 % (ref 35–47)
HGB BLD-MCNC: 14.2 GM/DL (ref 12–16)
LYMPHOCYTES # SPEC AUTO: 2.4 10*3/UL (ref 1–4.6)
MCH RBC QN AUTO: 30.3 PG (ref 26–32)
MCHC RBC AUTO-ENTMCNC: 32.4 G/DL (ref 32–36)
METHADONE UR QL: NEGATIVE
MONOCYTES # BLD AUTO: 0.56 10*3/UL (ref 0–1.3)
OPIATES UR QL: NEGATIVE
PCP UR QL CFM>20 NG/ML: NEGATIVE
PLATELET # BLD AUTO: 243 K/MM3 (ref 150–450)
POTASSIUM SERPLBLD-SCNC: 3.9 MMOL/L (ref 3.5–5.1)
PROT SERPL-MCNC: 7.5 G/DL (ref 6.3–8.2)
PROT UR STRIP-MCNC: NEGATIVE MG/DL
RBC # BLD AUTO: 4.69 M/MM3 (ref 4.1–5.4)
RBC #/AREA URNS HPF: (no result) /HPF (ref 0–2)
SODIUM SERPL-SCNC: 143 MMOL/L (ref 137–145)
THC UR QL SCN: NEGATIVE
WBC # BLD AUTO: 9.9 K/MM3 (ref 4–10.5)
WBC #/AREA URNS HPF: (no result) /HPF (ref 0–5)

## 2019-11-30 PROCEDURE — 70450 CT HEAD/BRAIN W/O DYE: CPT

## 2019-11-30 PROCEDURE — 80307 DRUG TEST PRSMV CHEM ANLYZR: CPT

## 2019-11-30 PROCEDURE — 85025 COMPLETE CBC W/AUTO DIFF WBC: CPT

## 2019-11-30 PROCEDURE — 96360 HYDRATION IV INFUSION INIT: CPT

## 2019-11-30 PROCEDURE — 87086 URINE CULTURE/COLONY COUNT: CPT

## 2019-11-30 PROCEDURE — 96374 THER/PROPH/DIAG INJ IV PUSH: CPT

## 2019-11-30 PROCEDURE — 99284 EMERGENCY DEPT VISIT MOD MDM: CPT

## 2019-11-30 PROCEDURE — 93005 ELECTROCARDIOGRAM TRACING: CPT

## 2019-11-30 PROCEDURE — 80053 COMPREHEN METABOLIC PANEL: CPT

## 2019-11-30 PROCEDURE — 81001 URINALYSIS AUTO W/SCOPE: CPT

## 2019-11-30 PROCEDURE — 36415 COLL VENOUS BLD VENIPUNCTURE: CPT

## 2019-11-30 NOTE — ERPHSYRPT
- History of Present Illness


Time Seen by Provider: 19 09:50


Source: patient, family


Exam Limitations: no limitations


Patient Subjective Stated Complaint: Pt states "I have been dizzy since 0130 

this morning.  The room is spinning.  I am nauseaous and have been vomiting."


Triage Nursing Assessment: Pt presented alert and oriented X 3, skin pwd. Pt 

eyes tightly closed, moaning.  PT holding arms out and rubbing her right ear.  

PT speech is clear, no weakness noted.


Physician History: 





48 y/o white female presents with sudden onset of dizziness. pt states she got 

up a 0130 and was spinning. room was still. pt states she has had dizziness and 

vomiting ever since. pt denies ever having sx before. denies head injury. 

denies new medications. pt denies cp. she denies flu like sx. she does state 

right ear feels as though it has to "pop"


Timing/Duration: today, hour(s) (8.5)


Severity: moderate


Associated Symptoms: nausea, vomiting, No abdominal pain, No shortness of breath

, No chest pain, No headaches, No syncope


Allergies/Adverse Reactions: 








venom-honey bee [bee venom (honey bee)] Allergy (Severe, Verified 19 11:15

)


 Difficulty Breathing


Iodinated Contrast Media [Iodinated Contrast Media - IV Dye] Allergy (

Intermediate, Verified 19 11:15)


 Itching


iodine Allergy (Verified 19 11:15)


 Difficulty Breathing


sumatriptan [From Imitrex] Allergy (Verified 19 11:15)


 Difficulty Breathing


sumatriptan succinate [From Imitrex] Allergy (Verified 19 11:15)


 Difficulty Breathing


cocoa butter Adverse Reaction (Intermediate, Verified 19 11:15)


 Rash


codeine [Codeine] Adverse Reaction (Verified 19 11:15)


 Vomiting


hydrochlorothiazide Adverse Reaction (Verified 19 11:15)


 Blisters


tramadol Adverse Reaction (Verified 19 11:15)


 Vomiting





Home Medications: 








Cyclobenzaprine HCl 10 mg*** [Cyclobenzaprine 10 MG***] 10 mg PO HSPRN PRN  [History]


Loratadine 10 mg*** [Claritin 10 mg***] 10 mg PO DAILY 16 [History]


Naproxen [Naprosyn] 500 mg PO BID PRN 16 [History]


Oxybutynin Chloride [Oxybutynin Chloride ER] 15 mg PO DAILY 16 [History]


Simvastatin 40 mg PO HS 16 [History]


Topiramate 100 mg*** [Topamax 100 MG***] 50 mg PO DAILY 16 [History]


Pregabalin 25 mg*** [Lyrica 25 MG***] 25 mg PO BID 17 [History]


Topiramate 100 mg*** [Topamax 100 MG***] 100 mg PO HS 17 [History]


Calcium 500 mg PO DAILY 19 [History]


Escitalopram Oxalate 5 mg PO HS 19 [History]


Hydroxyzine HCl 10 mg PO HS 19 [History]


Prenatal Vits W-Ca,Fe,FA(<1Mg) [Prenatal] 1 tab PO DAILY 19 [History]





Hx Tetanus, Diphtheria Vaccination/Date Given: No


Hx Influenza Vaccination/Date Given: Yes


Hx Pneumococcal Vaccination/Date Given: No


Immunizations Up to Date: Yes





- Review of Systems


Constitutional: No Symptoms


Eyes: No Symptoms


Ears, Nose, & Throat: No Symptoms


Respiratory: No Symptoms


Cardiac: No Symptoms


Abdominal/Gastrointestinal: Nausea, Vomiting


Genitourinary Symptoms: No Symptoms


Musculoskeletal: No Symptoms


Skin: No Symptoms


Neurological: Dizziness


Psychological: No Symptoms


Endocrine: No Symptoms


Hematologic/Lymphatic: No Symptoms


Immunological/Allergic: No Symptoms


All Other Systems: Reviewed and Negative





- Past Medical History


Pertinent Past Medical History: Yes


Neurological History: Migraines, Other


ENT History: Cataracts


Cardiac History: No Pertinent History


Respiratory History: COPD, Sleep Apnea


Endocrine Medical History: Other


Musculoskeletal History: Degenerative Disk Disease


GI Medical History: GERD


 History: No Pertinent History


Psycho-Social History: Anxiety, Depression


Female Reproductive Disorders: Uterine Cancer


Other Medical History: Notes tingling into B hands, adrenal gland removed





- Past Surgical History


Past Surgical History: Yes


Neuro Surgical History: No Pertinent History


Cardiac: No Pertinent History


Respiratory: No Pertinent History


Gastrointestinal: Appendectomy


Genitourinary: Other


Musculoskeletal: Other


Female Surgical History: Hysterectomy,  Section, Tubal Ligation


Other Surgical History: cervical fusion, adrenal gland removal





- Social History


Smoking Status: Current every day smoker


How long have you smoked: years


Exposure to second hand smoke: Yes


Drug Use: none


Patient Lives Alone: No





- Female History


Hx Last Menstrual Period: 


Hx Pregnant Now:  (hysterectomy)





- Nursing Vital Signs


Nursing Vital Signs: 


 Initial Vital Signs











Temperature  97.9 F   19 09:35


 


Pulse Rate  83   19 09:35


 


Respiratory Rate  20   19 09:35


 


Blood Pressure  122/88   19 09:35


 


O2 Sat by Pulse Oximetry  97   19 09:35








 Pain Scale











Pain Intensity                 0

















- Physical Exam


General Appearance: mild distress, alert, anxiety, obese


Eye Exam: PERRL/EOMI, eyes nml inspection


Ears, Nose, Throat Exam: normal ENT inspection, moist mucous membranes


Neck Exam: normal inspection, non-tender, supple, full range of motion


Respiratory Exam: normal breath sounds, lungs clear, airway intact, No chest 

tenderness, No respiratory distress


Cardiovascular Exam: regular rate/rhythm, normal heart sounds, normal 

peripheral pulses


Gastrointestinal/Abdomen Exam: soft, normal bowel sounds, No tenderness


Pelvic Exam: not done


Rectal Exam: not done


Back Exam: normal inspection, normal range of motion, No CVA tenderness, No 

vertebral tenderness


Extremity Exam: normal inspection, normal range of motion, pelvis stable


Neurologic Exam: alert, oriented x 3, cooperative, CNs II-XII nml as tested, 

normal mood/affect


Skin Exam: normal color, warm, dry


Lymphatic Exam: No adenopathy


**SpO2 Interpretation**: normal


SpO2: 97


O2 Delivery: Room Air





- Course


Nursing assessment & vital signs reviewed: Yes


EKG Interpreted by Me: RATE (72), Sinus Rhythm, NORMAL AXIS, NORMAL INTERVALS, 

1st degree AV Block (new ), NORMAL QRS, Other (no other changes when compared 

to ekg dated 3/17/17)


Ordered Tests: 


 Active Orders 24 hr











 Category Date Time Status


 


 Clean Catch Urine Specimen STAT Care  19 10:02 Active


 


 EKG-ER Only STAT Care  19 10:06 Active


 


 IV Insertion STAT Care  19 10:02 Active


 


 HEAD WITHOUT CONTRAST [CT] Stat Exams  19 10:04 Taken


 


 CBC W DIFF Stat Lab  19 10:25 Completed


 


 CMP Stat Lab  19 10:25 Completed


 


 CULTURE,URINE Stat Lab  19 10:04 Received


 


 UA W/RFX UR CULTURE Stat Lab  19 10:04 Completed


 


 Urine Triage Profile Stat Lab  19 10:04 Completed








Medication Summary














Discontinued Medications














Generic Name Dose Route Start Last Admin





  Trade Name Nyasia  PRN Reason Stop Dose Admin


 


Sodium Chloride  1,000 mls @ 999 mls/hr  19 10:02  19 11:19





  Sodium Chloride 0.9% 1000 Ml  IV  19 11:02  Infused





  .Q1H1M STA   Infusion





     





     





     





     


 


Sodium Chloride  Confirm  19 10:09  





  Sodium Chloride 0.9% 1000 Ml  Administered  19 10:10  





  Dose   





  1,000 mls @ ud   





  .ROUTE   





  .STK-MED ONE   





     





     





     





     


 


Meclizine HCl  25 mg  19 11:26  19 11:50





  Antivert 25 Mg***  PO  19 11:27  25 mg





  STAT ONE   Administration





     





     





     





     


 


Meclizine HCl  Confirm  19 11:48  





  Antivert 25 Mg***  Administered  19 11:49  





  Dose   





  25 mg   





  .ROUTE   





  .STK-MED ONE   





     





     





     





     


 


Ondansetron HCl  4 mg  19 10:02  19 10:12





  Zofran 4 Mg/2 Ml Vial**  IV  19 10:03  4 mg





  STAT ONE   Administration





     





     





     





     


 


Ondansetron HCl  Confirm  19 10:09  





  Zofran 4 Mg/2 Ml Vial**  Administered  19 10:10  





  Dose   





  4 mg   





  .ROUTE   





  .STK-MED ONE   





     





     





     





     











Lab/Rad Data: 


 Laboratory Result Diagrams





 19 10:25 





 19 10:25 





 Laboratory Results











  19 Range/Units





  10:25 10:25 10:04 


 


WBC   9.9   (4.0-10.5)  K/mm3


 


RBC   4.69   (4.1-5.4)  M/mm3


 


Hgb   14.2   (12.0-16.0)  gm/dl


 


Hct   43.8   (35-47)  %


 


MCV   93.4   ()  fl


 


MCH   30.3   (26-32)  pg


 


MCHC   32.4   (32-36)  g/dl


 


RDW   13.3   (11.5-14.0)  %


 


Plt Count   243   (150-450)  K/mm3


 


MPV   9.7 H   (6-9.5)  fl


 


Gran %   68.6 H   (36.0-66.0)  %


 


Eos # (Auto)   0.13   (0-0.5)  


 


Absolute Lymphs (auto)   2.40   (1.0-4.6)  


 


Absolute Monos (auto)   0.56   (0.0-1.3)  


 


Lymphocytes %   24.1   (24.0-44.0)  %


 


Monocytes %   5.6   (0.0-12.0)  %


 


Eosinophils %   1.3   (0.00-5.0)  %


 


Basophils %   0.4   (0.0-0.4)  %


 


Absolute Granulocytes   6.81   (1.4-6.9)  


 


Basophils #   0.04   (0-0.4)  


 


Sodium  143    (137-145)  mmol/L


 


Potassium  3.9    (3.5-5.1)  mmol/L


 


Chloride  108 H    ()  mmol/L


 


Carbon Dioxide  27    (22-30)  mmol/L


 


Anion Gap  12.0    (5-15)  MEQ/L


 


BUN  12    (7-17)  mg/dL


 


Creatinine  0.60    (0.52-1.04)  mg/dL


 


Estimated GFR  > 60.0    ML/MIN


 


Glucose  129 H    ()  mg/dL


 


Calcium  9.5    (8.4-10.2)  mg/dL


 


Total Bilirubin  0.40    (0.2-1.3)  mg/dL


 


AST  22    (14-36)  U/L


 


ALT  25    (0-35)  U/L


 


Alkaline Phosphatase  69    ()  U/L


 


Serum Total Protein  7.5    (6.3-8.2)  g/dL


 


Albumin  4.1    (3.5-5.0)  g/dL


 


Urine Color     (YELLOW)  


 


Urine Appearance     (CLEAR)  


 


Urine pH     (5-6)  


 


Ur Specific Gravity     (1.005-1.025)  


 


Urine Protein     (Negative)  


 


Urine Ketones     (NEGATIVE)  


 


Urine Blood     (0-5)  Bernard/ul


 


Urine Nitrite     (NEGATIVE)  


 


Urine Bilirubin     (NEGATIVE)  


 


Urine Urobilinogen     (0-1)  mg/dL


 


Ur Leukocyte Esterase     (NEGATIVE)  


 


Urine WBC (Auto)     (0-5)  /HPF


 


Urine RBC (Auto)     (0-2)  /HPF


 


U Epithel Cells (Auto)     (FEW)  /HPF


 


Urine Bacteria (Auto)     (NEGATIVE)  /HPF


 


Urine Mucus (Auto)     (NEGATIVE)  /HPF


 


Urine Culture Reflexed     (NO)  


 


Urine Glucose     (NEGATIVE)  mg/dL


 


Urine Opiates Level    NEGATIVE  (NEGATIVE)  


 


Ur Methadone    NEGATIVE  (NEGATIVE)  


 


Urine Barbiturates    NEGATIVE  (NEGATIVE)  


 


Ur Phencyclidine (PCP)    NEGATIVE  (NEGATIVE)  


 


Urine Amphetamine    NEGATIVE  (NEGATIVE)  


 


U Benzodiazepine Level    NEGATIVE  (NEGATIVE)  


 


Urine Cocaine    NEGATIVE  (NEGATIVE)  


 


Urine Marijuana (THC)    NEGATIVE  (NEGATIVE)  














  19 Range/Units





  10:04 


 


WBC   (4.0-10.5)  K/mm3


 


RBC   (4.1-5.4)  M/mm3


 


Hgb   (12.0-16.0)  gm/dl


 


Hct   (35-47)  %


 


MCV   ()  fl


 


MCH   (26-32)  pg


 


MCHC   (32-36)  g/dl


 


RDW   (11.5-14.0)  %


 


Plt Count   (150-450)  K/mm3


 


MPV   (6-9.5)  fl


 


Gran %   (36.0-66.0)  %


 


Eos # (Auto)   (0-0.5)  


 


Absolute Lymphs (auto)   (1.0-4.6)  


 


Absolute Monos (auto)   (0.0-1.3)  


 


Lymphocytes %   (24.0-44.0)  %


 


Monocytes %   (0.0-12.0)  %


 


Eosinophils %   (0.00-5.0)  %


 


Basophils %   (0.0-0.4)  %


 


Absolute Granulocytes   (1.4-6.9)  


 


Basophils #   (0-0.4)  


 


Sodium   (137-145)  mmol/L


 


Potassium   (3.5-5.1)  mmol/L


 


Chloride   ()  mmol/L


 


Carbon Dioxide   (22-30)  mmol/L


 


Anion Gap   (5-15)  MEQ/L


 


BUN   (7-17)  mg/dL


 


Creatinine   (0.52-1.04)  mg/dL


 


Estimated GFR   ML/MIN


 


Glucose   ()  mg/dL


 


Calcium   (8.4-10.2)  mg/dL


 


Total Bilirubin   (0.2-1.3)  mg/dL


 


AST   (14-36)  U/L


 


ALT   (0-35)  U/L


 


Alkaline Phosphatase   ()  U/L


 


Serum Total Protein   (6.3-8.2)  g/dL


 


Albumin   (3.5-5.0)  g/dL


 


Urine Color  YELLOW  (YELLOW)  


 


Urine Appearance  SLIGHTLY CLOUDY  (CLEAR)  


 


Urine pH  9.0  (5-6)  


 


Ur Specific Gravity  1.019  (1.005-1.025)  


 


Urine Protein  NEGATIVE  (Negative)  


 


Urine Ketones  NEGATIVE  (NEGATIVE)  


 


Urine Blood  SMALL  (0-5)  Bernard/ul


 


Urine Nitrite  NEGATIVE  (NEGATIVE)  


 


Urine Bilirubin  NEGATIVE  (NEGATIVE)  


 


Urine Urobilinogen  NEGATIVE  (0-1)  mg/dL


 


Ur Leukocyte Esterase  NEGATIVE  (NEGATIVE)  


 


Urine WBC (Auto)  3-5  (0-5)  /HPF


 


Urine RBC (Auto)  6-10  (0-2)  /HPF


 


U Epithel Cells (Auto)  FEW  (FEW)  /HPF


 


Urine Bacteria (Auto)  NONE  (NEGATIVE)  /HPF


 


Urine Mucus (Auto)  SLIGHT  (NEGATIVE)  /HPF


 


Urine Culture Reflexed  YES  (NO)  


 


Urine Glucose  NEGATIVE  (NEGATIVE)  mg/dL


 


Urine Opiates Level   (NEGATIVE)  


 


Ur Methadone   (NEGATIVE)  


 


Urine Barbiturates   (NEGATIVE)  


 


Ur Phencyclidine (PCP)   (NEGATIVE)  


 


Urine Amphetamine   (NEGATIVE)  


 


U Benzodiazepine Level   (NEGATIVE)  


 


Urine Cocaine   (NEGATIVE)  


 


Urine Marijuana (THC)   (NEGATIVE)  














- Progress


Progress: improved, re-examined


Progress Note: 





19 12:45


ct head-no acute intracranial abnormality. 





pt states she is better. not completely resolved. 


Counseled pt/family regarding: lab results, diagnosis, need for follow-up, rad 

results





- Departure


Departure Disposition: Home


Clinical Impression: 


 Dizziness





Condition: Stable


Critical Care Time: No


Referrals: 


BUD RAMIREZ MD [Primary Care Provider] - 


Additional Instructions: 


take medications as prescribed. follow up with primary doctor for further 

management


Prescriptions: 


Meclizine HCl 25 mg*** [Antivert 25 mg***] 25 mg PO Q8H PRN #10 tablet


 PRN Reason: Dizziness

## 2019-11-30 NOTE — XRAY
Indication: Nausea and vertigo.



Multiple contiguous axial images obtained through the head without contrast.



Comparison: July 27, 2016.



Again normal appearing brain parenchyma, ventricles, and bony calvarium.

There is mild/moderate mucosal thickening of both ethmoid, left sphenoid, and

both maxillary sinuses.  Mastoid air cells are clear.



Impression: Paranasal sinus disease.  Remaining CT head without contrast exam

is normal.



Comment: Preliminary interpretation was made by [facility].  No critical discrepancy.



CTDI 55.94

## 2022-12-18 ENCOUNTER — HOSPITAL ENCOUNTER (EMERGENCY)
Dept: HOSPITAL 33 - ED | Age: 52
Discharge: HOME | End: 2022-12-18
Payer: COMMERCIAL

## 2022-12-18 VITALS — DIASTOLIC BLOOD PRESSURE: 91 MMHG | SYSTOLIC BLOOD PRESSURE: 133 MMHG | HEART RATE: 80 BPM

## 2022-12-18 VITALS — OXYGEN SATURATION: 96 %

## 2022-12-18 DIAGNOSIS — M79.10: ICD-10-CM

## 2022-12-18 DIAGNOSIS — J10.1: Primary | ICD-10-CM

## 2022-12-18 DIAGNOSIS — R50.9: ICD-10-CM

## 2022-12-18 DIAGNOSIS — R09.81: ICD-10-CM

## 2022-12-18 DIAGNOSIS — Z72.0: ICD-10-CM

## 2022-12-18 DIAGNOSIS — Z79.899: ICD-10-CM

## 2022-12-18 DIAGNOSIS — R05.1: ICD-10-CM

## 2022-12-18 LAB
FLUAV AG NPH QL IA: POSITIVE
FLUBV AG NPH QL IA: NEGATIVE
RSV AG SPEC QL IA: NEGATIVE
SARS-COV-2 AG RESP QL IA.RAPID: NEGATIVE

## 2022-12-18 PROCEDURE — 0241U: CPT

## 2022-12-18 PROCEDURE — 99282 EMERGENCY DEPT VISIT SF MDM: CPT

## 2022-12-18 NOTE — ERPHSYRPT
- History of Present Illness


Source: patient


Exam Limitations: no limitations


Patient Subjective Stated Complaint: C/O cough, fever, chills, headache, body 

aches


Triage Nursing Assessment: Patient ambulated back to ED. No SOB noted. She is 

alert and oriented. Patient is shivering, c/o chills. Skin is warm to touch but 

patient is currently afebrile. A forceful, non-productive cough is present. 

Attempted to listen to lung fields and patient forcefully coughs everytime she 

goes to exhale when deep breathing. NO abnormal lungs sounds noted.


Physician History: 





51 yo wf w cough/coryza/ST/fever/chills/myalgias wo N/V/D/dysuria x 2 days. Pt 

swabs inmates for CV19 in [place]. She smokes 1ppd. Chest pain/dyspnea denied.


Timing/Duration: other (2 days)


Cough Quality/Degree: dry cough


Possible Cause: no prior episodes


Modifying Factors: Improves With: nothing


Associated Symptoms: fever, chills, cough, headache, muscle aches, nasal 

congestion, nasal drainage, sore throat


Allergies/Adverse Reactions: 








venom-honey bee [bee venom (honey bee)] Allergy (Severe, Verified 22 

15:54)


   Difficulty Breathing


Iodinated Contrast Media [Iodinated Contrast Media - IV Dye] Allergy 

(Intermediate, Verified 22 15:54)


   Itching


iodine Allergy (Verified 22 15:54)


   Difficulty Breathing


sumatriptan [From Imitrex] Allergy (Verified 22 15:54)


   Difficulty Breathing


sumatriptan succinate [From Imitrex] Allergy (Verified 22 15:54)


   Difficulty Breathing


cocoa butter Adverse Reaction (Intermediate, Verified 22 15:54)


   Rash


codeine [Codeine] Adverse Reaction (Verified 22 15:54)


   Vomiting


hydrochlorothiazide Adverse Reaction (Verified 22 15:54)


   Blisters


tramadol Adverse Reaction (Verified 22 15:54)


   Vomiting





Home Medications: 








Cyclobenzaprine HCl 10 mg*** [Cyclobenzaprine 10 MG***] 10 mg PO HSPRN PRN 

16 [History]


Loratadine 10 mg*** [Claritin 10 mg***] 10 mg PO DAILY 07/27/16 [History]


Naproxen [Naprosyn] 500 mg PO BID PRN 16 [History]


Oxybutynin Chloride [Oxybutynin Chloride ER] 15 mg PO DAILY 16 [History]


Simvastatin 40 mg PO HS 16 [History]


Topiramate 100 mg*** [Topamax 100 MG***] 50 mg PO DAILY 16 [History]


Pregabalin 25 mg*** [Lyrica 25 MG***] 25 mg PO BID 17 [History]


Topiramate 100 mg*** [Topamax 100 MG***] 100 mg PO HS 17 [History]


Calcium 500 mg PO DAILY 19 [History]


Escitalopram Oxalate 5 mg PO HS 19 [History]


Prenatal Vits W-Ca,Fe,FA(<1Mg) [Prenatal] 1 tab PO DAILY 19 [History]


hydrOXYzine HCL [Hydroxyzine HCl] 10 mg PO HS 19 [History]





Hx Tetanus, Diphtheria Vaccination/Date Given: Yes


Hx Influenza Vaccination/Date Given: No


Hx Pneumococcal Vaccination/Date Given: No


Immunizations Up to Date: Yes





Travel Risk





- International Travel


Have you traveled outside of the country in past 3 weeks: No





- Coronavirus Screening


Are you exhibiting any of the following symptoms?: Yes


Symptoms: Fever, Cough: New Onset, Headaches/Body Aches/Fatigue


Close contact with a COVID-19 positive Pt in past 14-21 Days: Yes





- Vaccine Status


Have you recieved a Covid-19 vaccination: Yes


: Moderna





- Vaccination Dates


Date of 2cond Vaccination (if applicable): 





- Review of Systems


Constitutional: No Symptoms, Fever, Chills


Eyes: No Symptoms


Ears, Nose, & Throat: No Symptoms, Nose Pain, Nose Congestion, Throat Pain


Respiratory: No Symptoms, Cough


Cardiac: No Symptoms


Abdominal/Gastrointestinal: No Symptoms


Genitourinary Symptoms: No Symptoms


Musculoskeletal: No Symptoms, Myalgias


Skin: No Symptoms


Neurological: No Symptoms


Psychological: No Symptoms


Endocrine: No Symptoms


Hematologic/Lymphatic: No Symptoms


Immunological/Allergic: No Symptoms





- Past Medical History


Pertinent Past Medical History: Yes


Neurological History: Migraines, Other


ENT History: Cataracts


Cardiac History: No Pertinent History


Respiratory History: COPD, Sleep Apnea


Endocrine Medical History: Other


Musculoskeletal History: Degenerative Disk Disease


GI Medical History: GERD


 History: No Pertinent History


Psycho-Social History: Anxiety, Depression


Female Reproductive Disorders: Uterine Cancer


Other Medical History: Notes tingling into B hands, adrenal gland removed





- Past Surgical History


Past Surgical History: Yes


Neuro Surgical History: No Pertinent History


Cardiac: No Pertinent History


Respiratory: No Pertinent History


Gastrointestinal: Appendectomy


Genitourinary: Other


Musculoskeletal: Other


Female Surgical History: Hysterectomy,  Section, Tubal Ligation


Other Surgical History: cervical fusion, adrenal gland removal, carpal tunnel 

surgery to both wrists





- Social History


Smoking Status: Current every day smoker


How long have you smoked: 40 years


Exposure to second hand smoke: Yes


Drug Use: marijuana


Patient Lives Alone: No





- Nursing Vital Signs


Nursing Vital Signs: 


                               Initial Vital Signs











Temperature  98.6 F   22 15:54


 


Pulse Rate  94 H  22 15:54


 


Respiratory Rate  18   22 15:54


 


Blood Pressure  123/102   22 15:54


 


O2 Sat by Pulse Oximetry  96   22 15:54








                                   Pain Scale











Pain Intensity                 8











WNL





- Physical Exam


General Appearance: no apparent distress


Eye Exam: PERRL/EOMI, eyes nml inspection


Ears, Nose, Throat Exam: normal ENT inspection, TMs normal, pharyngeal erythema 

(Mild pharyngeal erythema)


Neck Exam: normal inspection, non-tender, supple, full range of motion, No 

meningismus, No mass, No Brudzinski, No Kernig's, No carotid bruit


Respiratory Exam: airway intact, wheezing (Occ B but overall clear), No 

respiratory distress


Cardiovascular Exam: regular rate/rhythm, normal heart sounds, normal peripheral

 pulses, capillary refill <2 sec, No murmur


Gastrointestinal/Abdomen Exam: soft, normal bowel sounds, No tenderness


Back Exam: normal inspection, normal range of motion, No CVA tenderness, No 

vertebral tenderness


Extremity Exam: normal inspection, normal range of motion


Neurologic Exam: alert, oriented x 3, cooperative, CNs II-XII nml as tested, 

normal mood/affect, nml cerebellar function, nml station & gait, sensation nml


Skin Exam: normal color, warm, dry, No rash


Lymphatic Exam: No adenopathy


**SpO2 Interpretation**: normal


SpO2: 96


O2 Delivery: Room Air





- Course


Nursing assessment & vital signs reviewed: Yes


Lab/Rad Data: 


                               Laboratory Results











  22 Range/Units





  16:00 


 


Influenza Type A Ag  POSITIVE  (NEGATIVE)  


 


Influenza Type B Ag  NEGATIVE  (NEGATIVE)  


 


RSV (PCR)  NEGATIVE  (Negative)  


 


SARS-CoV-2 (PCR)  NEGATIVE  (NEGATIVE)  














- Progress


Counseled pt/family regarding: lab results, diagnosis, need for follow-up





- Departure


Departure Disposition: Home


Clinical Impression: 


 Influenza A





Condition: Stable


Critical Care Time: No


Referrals: 


ANGELITA POST NP [Primary Care Provider] - Follow up/PCP as directed


Instructions:  Flu, Adult (DC), Cough, Adult (DC)


Additional Instructions: 


Rest/Fluids/Motrin/Tylenol


Tamiflu twice a day for 5 days


Forms:  Work/School Release Form


Prescriptions: 


Oseltamivir 75 mg*** [Tamiflu 75MG Capsule***] 75 mg PO BID 5 Days #10 cap